# Patient Record
Sex: MALE | Race: WHITE | NOT HISPANIC OR LATINO | Employment: FULL TIME | URBAN - METROPOLITAN AREA
[De-identification: names, ages, dates, MRNs, and addresses within clinical notes are randomized per-mention and may not be internally consistent; named-entity substitution may affect disease eponyms.]

---

## 2020-09-18 ENCOUNTER — HOSPITAL ENCOUNTER (OUTPATIENT)
Dept: URGENT CARE | Facility: CLINIC | Age: 35
Discharge: HOME | End: 2020-09-18
Attending: FAMILY MEDICINE

## 2020-09-22 LAB — SARS-COV-2 RNA SPEC QL NAA+PROBE: NOT DETECTED

## 2021-05-19 ENCOUNTER — OFFICE VISIT CONVERTED (OUTPATIENT)
Dept: SURGERY | Facility: CLINIC | Age: 36
End: 2021-05-19
Attending: SURGERY

## 2021-05-19 ENCOUNTER — CONVERSION ENCOUNTER (OUTPATIENT)
Dept: SURGERY | Facility: CLINIC | Age: 36
End: 2021-05-19

## 2021-05-22 ENCOUNTER — TRANSCRIBE ORDERS (OUTPATIENT)
Dept: LAB | Facility: HOSPITAL | Age: 36
End: 2021-05-22

## 2021-05-22 DIAGNOSIS — Z01.818 PRE-OP TESTING: Primary | ICD-10-CM

## 2021-06-05 NOTE — H&P
"   History and Physical      Patient Name: Dominick Agarwal   Patient ID: [id number]   Sex: Male   YOB: 1985    Referring Provider: Henry Singh MD    Visit Date: May 19, 2021    Provider: Venkata Davenport MD   Location: [facility]   Location Address: [address] [place] [address]  [id number]   Location Phone: [phone]          Chief Complaint  · Hernia Consult      History Of Present Illness  Dominick Agarwal is a 35 year old /White male who presents to the office today as a consult from Henry Singh MD. He presents today for evaluation of an umbilical hernia. The patient reports that he first noticed this hernia about 5-6 months ago. He reports that he has some heavy lifting at his job and when he lifts or moves his abdomen against things, he does have some pain. He does report that over the last 5-6 months, the umbilical hernia does seem to be getting larger. He is not reporting any obstructive type symptoms. He does not note any overlying skin changes.       Past Medical History  *No Pertinent Past Medical History         Past Surgical History  *Denies any surgical procedures         Allergy List  NO KNOWN DRUG ALLERGIES         Family Medical History  -Father's Family History Unknown; -Mother's Family History Unknown         Social History  Alcohol; Tobacco (Former)         Review of Systems  · Gastrointestinal  o Denies  o : nausea, vomiting, diarrhea, constipation      Vitals  Date Time BP Position Site L\R Cuff Size HR RR TEMP (F) WT  HT  BMI kg/m2 BSA m2 O2 Sat FR L/min FiO2 HC       05/19/2021 10:12 AM         298lbs 4oz 5'  11\" 41.6 2.6             Physical Examination  · Constitutional  o Appearance  o : well developed, well-nourished, alert and in no acute distress  · Head and Face  o Head  o :   § Inspection  § : no deformities or lesions  · Eyes  o Conjunctivae  o : clear  o Sclerae  o : clear  · Neck  o Inspection/Palpation  o : normal " appearance, no masses or tenderness, trachea midline  · Respiratory  o Respiratory Effort  o : breathing unlabored  o Inspection of Chest  o : normal appearance, no retractions  · Cardiovascular  o Heart  o : regular rate and rhythm  · Gastrointestinal  o Abdominal Examination  o : abdomen is soft. He has an umbilical hernia that is reducible and tender upon reduction.  · Lymphatic  o Neck  o : no lymphadenopathy present  o Axilla  o : no lymphadenopathy present  o Groin  o : no lymphadenopathy present  · Skin and Subcutaneous Tissue  o General Inspection  o : no rashes present, no lesions present, no areas of discoloration  · Neurologic  o Cranial Nerves  o : grossly intact  o Sensation  o : grossly intact  o Gait and Station  o :   § Gait Screening  § : normal gait, able to stand without diffculty  o Cerebellar Function  o : no obvious abnormalities  · Psychiatric  o Judgement and Insight  o : judgment and insight intact  o Mood and Affect  o : mood normal, affect appropriate          Assessment  · Pre-Surgical Orders     V72.84  · Hernia, Umibilical     553.1  · Pre-op testing     V72.84/Z01.818       Patient with symptomatic umbilical hernia. It is not a terribly large hernia and it is reducible but the patient reports that with doing all the heavy lifting, it seems like he will test the repair. Therefore, I have recommended an intraperitoneal repair.       Plan  · Orders  o General Surgery Order (GENOR) - 553.1, V72.84 - 06/11/2021  o [facility] [place] [facility] Pre-Op Covid-19 Screening (76888) - V72.84/Z01.818 - 06/07/2021 6/07/21 at 1130AM  · Medications  o Medications have been Reconciled  o Transition of Care or Provider Policy  · Instructions  o PLAN:   o Handouts Provided-Pre-Procedure Instructions including date and time and location of procedure.  o Surgical Facility: [facility]  o ****Patient Status****  o Outpatient  o ********************  o RISK AND BENEFITS:  o Consent for surgery: Given these options, the  patient has verbally expressed an understanding of the risks of surgery and finds these risks acceptable. We will proceed with surgery as soon as possible.  o Consult Anesthesia for any post-operative block, or any pain management procedure deemed necessary by the anestesiologist for adequate post-operative pain control.   o O.R. PREP: Per protocol  o IV: Per Anesthesia  o IV: LR@ 75ml/hr  o PLEASE SIGN PERMIT FOR: Robotic umbilical hernia repair with possible mesh  o *__Kefzol 2 gram IV on call to OR.  o *___The above History and Physical Examination has been completed within 30 days of admission.  o Pre-Admission Testing Date: [facility] PHONE: 6/07/21 at 130PM  o Electronically Identified Patient Education Materials Provided Electronically     Pros and cons of open, laparoscopic, and robotic repair were discussed. I have recommended a robotic repair. Risks, benefits, and alternatives were discussed with the patient extensively. All questions were answered. The patient voiced understanding and agrees to proceed with the above plan.             Electronically Signed by: Cheli Carlson-, -Author on May 21, 2021 01:48:43 PM  Electronically Co-signed by: Venkata Davenport MD -Reviewer on May 22, 2021 05:02:48 PM

## 2021-06-05 NOTE — H&P (VIEW-ONLY)
"   History and Physical      Patient Name: Dominick Agarwal   Patient ID: [id number]   Sex: Male   YOB: 1985    Referring Provider: Henry Singh MD    Visit Date: May 19, 2021    Provider: Venkata Davenport MD   Location[place] [facility]   Location Address: [address] [place] [address]  [id number]   Location Phone: [phone]          Chief Complaint  · Hernia Consult      History Of Present Illness  Dominick Agarwal is a 35 year old /White male who presents to the office today as a consult from Henry Singh MD. He presents today for evaluation of an umbilical hernia. The patient reports that he first noticed this hernia about 5-6 months ago. He reports that he has some heavy lifting at his job and when he lifts or moves his abdomen against things, he does have some pain. He does report that over the last 5-6 months, the umbilical hernia does seem to be getting larger. He is not reporting any obstructive type symptoms. He does not note any overlying skin changes.       Past Medical History  *No Pertinent Past Medical History         Past Surgical History  *Denies any surgical procedures         Allergy List  NO KNOWN DRUG ALLERGIES         Family Medical History  -Father's Family History Unknown; -Mother's Family History Unknown         Social History  Alcohol; Tobacco (Former)         Review of Systems  · Gastrointestinal  o Denies  o : nausea, vomiting, diarrhea, constipation      Vitals  Date Time BP Position Site L\R Cuff Size HR RR TEMP (F) WT  HT  BMI kg/m2 BSA m2 O2 Sat FR L/min FiO2 HC       05/19/2021 10:12 AM         298lbs 4oz 5'  11\" 41.6 2.6             Physical Examination  · Constitutional  o Appearance  o : well developed, well-nourished, alert and in no acute distress  · Head and Face  o Head  o :   § Inspection  § : no deformities or lesions  · Eyes  o Conjunctivae  o : clear  o Sclerae  o : clear  · Neck  o Inspection/Palpation  o : normal " appearance, no masses or tenderness, trachea midline  · Respiratory  o Respiratory Effort  o : breathing unlabored  o Inspection of Chest  o : normal appearance, no retractions  · Cardiovascular  o Heart  o : regular rate and rhythm  · Gastrointestinal  o Abdominal Examination  o : abdomen is soft. He has an umbilical hernia that is reducible and tender upon reduction.  · Lymphatic  o Neck  o : no lymphadenopathy present  o Axilla  o : no lymphadenopathy present  o Groin  o : no lymphadenopathy present  · Skin and Subcutaneous Tissue  o General Inspection  o : no rashes present, no lesions present, no areas of discoloration  · Neurologic  o Cranial Nerves  o : grossly intact  o Sensation  o : grossly intact  o Gait and Station  o :   § Gait Screening  § : normal gait, able to stand without diffculty  o Cerebellar Function  o : no obvious abnormalities  · Psychiatric  o Judgement and Insight  o : judgment and insight intact  o Mood and Affect  o : mood normal, affect appropriate          Assessment  · Pre-Surgical Orders     V72.84  · Hernia, Umibilical     553.1  · Pre-op testing     V72.84/Z01.818       Patient with symptomatic umbilical hernia. It is not a terribly large hernia and it is reducible but the patient reports that with doing all the heavy lifting, it seems like he will test the repair. Therefore, I have recommended an intraperitoneal repair.       Plan  · Orders  o General Surgery Order (GENOR) - 553.1, V72.84 - 06/11/2021  o [facility] [place] Pre-Op Covid-19 Screening (07926) - V72.84/Z01.818 - 06/07/2021 6/07/21 at 1130AM  · Medications  o Medications have been Reconciled  o Transition of Care or Provider Policy  · Instructions  o PLAN:   o Handouts Provided-Pre-Procedure Instructions including date and time and location of procedure.  o Surgical Facility: [facility]  o ****Patient Status****  o Outpatient  o ********************  o RISK AND BENEFITS:  o Consent for surgery: Given these options, the  patient has verbally expressed an understanding of the risks of surgery and finds these risks acceptable. We will proceed with surgery as soon as possible.  o Consult Anesthesia for any post-operative block, or any pain management procedure deemed necessary by the anestesiologist for adequate post-operative pain control.   o O.R. PREP: Per protocol  o IV: Per Anesthesia  o IV: LR@ 75ml/hr  o PLEASE SIGN PERMIT FOR: Robotic umbilical hernia repair with possible mesh  o *__Kefzol 2 gram IV on call to OR.  o *___The above History and Physical Examination has been completed within 30 days of admission.  o Pre-Admission Testing Date: [facility] PHONE: 6/07/21 at 130PM  o Electronically Identified Patient Education Materials Provided Electronically     Pros and cons of open, laparoscopic, and robotic repair were discussed. I have recommended a robotic repair. Risks, benefits, and alternatives were discussed with the patient extensively. All questions were answered. The patient voiced understanding and agrees to proceed with the above plan.             Electronically Signed by: Cheli Carlson-, -Author on May 21, 2021 01:48:43 PM  Electronically Co-signed by: Venkata Davenport MD -Reviewer on May 22, 2021 05:02:48 PM

## 2021-06-07 ENCOUNTER — LAB (OUTPATIENT)
Dept: LAB | Facility: HOSPITAL | Age: 36
End: 2021-06-07

## 2021-06-07 DIAGNOSIS — Z01.818 PRE-OP TESTING: ICD-10-CM

## 2021-06-07 PROCEDURE — C9803 HOPD COVID-19 SPEC COLLECT: HCPCS

## 2021-06-07 PROCEDURE — U0003 INFECTIOUS AGENT DETECTION BY NUCLEIC ACID (DNA OR RNA); SEVERE ACUTE RESPIRATORY SYNDROME CORONAVIRUS 2 (SARS-COV-2) (CORONAVIRUS DISEASE [COVID-19]), AMPLIFIED PROBE TECHNIQUE, MAKING USE OF HIGH THROUGHPUT TECHNOLOGIES AS DESCRIBED BY CMS-2020-01-R: HCPCS

## 2021-06-07 NOTE — PRE-PROCEDURE INSTRUCTIONS
PATIENT INSTRUCTED TO BE:    - NPO AFTER MIDNIGHT EXCEPT CAN HAVE CLEAR LIQUIDS 2 HOURS PIROR TO SURGERY ARRIVAL TIME     - TO HOLD ALL VITAMINS, SUPPLEMENTS, NSAIDS FOR ONE WEEK PRIOR TO THEIR SURGICAL PROCEDURE    - INSTRUCTED TO TAKE THE FOLLOWING MEDICATIONS THE DAY OF SURGERY:    NO MEDICATIONS    PATIENT VERBALIZED UNDERSTANDING

## 2021-06-08 LAB — SARS-COV-2 RNA RESP QL NAA+PROBE: NOT DETECTED

## 2021-06-11 ENCOUNTER — HOSPITAL ENCOUNTER (OUTPATIENT)
Facility: HOSPITAL | Age: 36
Discharge: HOME | End: 2021-06-11
Attending: SURGERY | Admitting: SURGERY

## 2021-06-11 ENCOUNTER — ANESTHESIA (OUTPATIENT)
Dept: PERIOP | Facility: HOSPITAL | Age: 36
End: 2021-06-11

## 2021-06-11 ENCOUNTER — ANESTHESIA EVENT (OUTPATIENT)
Dept: PERIOP | Facility: HOSPITAL | Age: 36
End: 2021-06-11

## 2021-06-11 VITALS
SYSTOLIC BLOOD PRESSURE: 146 MMHG | HEIGHT: 71 IN | HEART RATE: 68 BPM | WEIGHT: 299.61 LBS | BODY MASS INDEX: 41.94 KG/M2 | RESPIRATION RATE: 18 BRPM | OXYGEN SATURATION: 95 % | DIASTOLIC BLOOD PRESSURE: 92 MMHG | TEMPERATURE: 97.5 F

## 2021-06-11 DIAGNOSIS — K42.9 UMBILICAL HERNIA WITHOUT OBSTRUCTION AND WITHOUT GANGRENE: Primary | ICD-10-CM

## 2021-06-11 PROCEDURE — 25010000002 MIDAZOLAM PER 1MG: Performed by: ANESTHESIOLOGY

## 2021-06-11 PROCEDURE — 25010000002 HYDROMORPHONE 1 MG/ML SOLUTION: Performed by: NURSE ANESTHETIST, CERTIFIED REGISTERED

## 2021-06-11 PROCEDURE — C1889 IMPLANT/INSERT DEVICE, NOC: HCPCS | Performed by: SURGERY

## 2021-06-11 PROCEDURE — 25010000002 PROPOFOL 10 MG/ML EMULSION: Performed by: NURSE ANESTHETIST, CERTIFIED REGISTERED

## 2021-06-11 PROCEDURE — 25010000003 LIDOCAINE 1 % SOLUTION 20 ML VIAL: Performed by: SURGERY

## 2021-06-11 PROCEDURE — 25010000002 DEXAMETHASONE PER 1 MG: Performed by: NURSE ANESTHETIST, CERTIFIED REGISTERED

## 2021-06-11 PROCEDURE — 25010000002 ONDANSETRON PER 1 MG: Performed by: NURSE ANESTHETIST, CERTIFIED REGISTERED

## 2021-06-11 PROCEDURE — 49652 PR LAP, VENTRAL HERNIA REPAIR,REDUCIBLE: CPT | Performed by: SURGERY

## 2021-06-11 PROCEDURE — 25010000003 MEPERIDINE PER 100 MG: Performed by: NURSE ANESTHETIST, CERTIFIED REGISTERED

## 2021-06-11 PROCEDURE — 25010000002 FENTANYL CITRATE (PF) 50 MCG/ML SOLUTION: Performed by: NURSE ANESTHETIST, CERTIFIED REGISTERED

## 2021-06-11 PROCEDURE — C1781 MESH (IMPLANTABLE): HCPCS | Performed by: SURGERY

## 2021-06-11 PROCEDURE — 49652 PR LAP, VENTRAL HERNIA REPAIR,REDUCIBLE: CPT | Performed by: SPECIALIST/TECHNOLOGIST, OTHER

## 2021-06-11 DEVICE — VENTRALIGHT ST MESH
Type: IMPLANTABLE DEVICE | Site: UMBILICAL | Status: FUNCTIONAL
Brand: VENTRALIGHT ST

## 2021-06-11 DEVICE — ABSORBABLE WOUND CLOSURE DEVICE
Type: IMPLANTABLE DEVICE | Site: UMBILICAL | Status: FUNCTIONAL
Brand: V-LOC 180

## 2021-06-11 RX ORDER — MAGNESIUM HYDROXIDE 1200 MG/15ML
LIQUID ORAL AS NEEDED
Status: DISCONTINUED | OUTPATIENT
Start: 2021-06-11 | End: 2021-06-11 | Stop reason: HOSPADM

## 2021-06-11 RX ORDER — KETAMINE HYDROCHLORIDE 50 MG/ML
INJECTION, SOLUTION, CONCENTRATE INTRAMUSCULAR; INTRAVENOUS AS NEEDED
Status: DISCONTINUED | OUTPATIENT
Start: 2021-06-11 | End: 2021-06-11 | Stop reason: SURG

## 2021-06-11 RX ORDER — ACETAMINOPHEN 500 MG
1000 TABLET ORAL ONCE
Status: COMPLETED | OUTPATIENT
Start: 2021-06-11 | End: 2021-06-11

## 2021-06-11 RX ORDER — SUCCINYLCHOLINE/SOD CL,ISO/PF 100 MG/5ML
SYRINGE (ML) INTRAVENOUS AS NEEDED
Status: DISCONTINUED | OUTPATIENT
Start: 2021-06-11 | End: 2021-06-11 | Stop reason: SURG

## 2021-06-11 RX ORDER — HYDROCODONE BITARTRATE AND ACETAMINOPHEN 5; 325 MG/1; MG/1
1-2 TABLET ORAL EVERY 4 HOURS PRN
Qty: 20 TABLET | Refills: 0 | Status: SHIPPED | OUTPATIENT
Start: 2021-06-11 | End: 2022-10-11

## 2021-06-11 RX ORDER — CEFAZOLIN SODIUM IN 0.9 % NACL 3 G/100 ML
3 INTRAVENOUS SOLUTION, PIGGYBACK (ML) INTRAVENOUS ONCE
Status: COMPLETED | OUTPATIENT
Start: 2021-06-11 | End: 2021-06-11

## 2021-06-11 RX ORDER — SODIUM CHLORIDE, SODIUM LACTATE, POTASSIUM CHLORIDE, CALCIUM CHLORIDE 600; 310; 30; 20 MG/100ML; MG/100ML; MG/100ML; MG/100ML
9 INJECTION, SOLUTION INTRAVENOUS CONTINUOUS PRN
Status: DISCONTINUED | OUTPATIENT
Start: 2021-06-11 | End: 2021-06-11 | Stop reason: HOSPADM

## 2021-06-11 RX ORDER — FENTANYL CITRATE 50 UG/ML
INJECTION, SOLUTION INTRAMUSCULAR; INTRAVENOUS AS NEEDED
Status: DISCONTINUED | OUTPATIENT
Start: 2021-06-11 | End: 2021-06-11 | Stop reason: SURG

## 2021-06-11 RX ORDER — HYDROCODONE BITARTRATE AND ACETAMINOPHEN 5; 325 MG/1; MG/1
1 TABLET ORAL ONCE AS NEEDED
Status: DISCONTINUED | OUTPATIENT
Start: 2021-06-11 | End: 2021-06-11 | Stop reason: HOSPADM

## 2021-06-11 RX ORDER — ONDANSETRON 2 MG/ML
INJECTION INTRAMUSCULAR; INTRAVENOUS AS NEEDED
Status: DISCONTINUED | OUTPATIENT
Start: 2021-06-11 | End: 2021-06-11 | Stop reason: SURG

## 2021-06-11 RX ORDER — GLYCOPYRROLATE 0.2 MG/ML
0.2 INJECTION INTRAMUSCULAR; INTRAVENOUS
Status: COMPLETED | OUTPATIENT
Start: 2021-06-11 | End: 2021-06-11

## 2021-06-11 RX ORDER — DEXAMETHASONE SODIUM PHOSPHATE 4 MG/ML
INJECTION, SOLUTION INTRA-ARTICULAR; INTRALESIONAL; INTRAMUSCULAR; INTRAVENOUS; SOFT TISSUE AS NEEDED
Status: DISCONTINUED | OUTPATIENT
Start: 2021-06-11 | End: 2021-06-11 | Stop reason: SURG

## 2021-06-11 RX ORDER — MIDAZOLAM HYDROCHLORIDE 1 MG/ML
2 INJECTION INTRAMUSCULAR; INTRAVENOUS ONCE
Status: COMPLETED | OUTPATIENT
Start: 2021-06-11 | End: 2021-06-11

## 2021-06-11 RX ORDER — ROCURONIUM BROMIDE 10 MG/ML
INJECTION, SOLUTION INTRAVENOUS AS NEEDED
Status: DISCONTINUED | OUTPATIENT
Start: 2021-06-11 | End: 2021-06-11 | Stop reason: SURG

## 2021-06-11 RX ORDER — SODIUM CHLORIDE 0.9 % (FLUSH) 0.9 %
10 SYRINGE (ML) INJECTION EVERY 12 HOURS SCHEDULED
Status: DISCONTINUED | OUTPATIENT
Start: 2021-06-11 | End: 2021-06-11 | Stop reason: HOSPADM

## 2021-06-11 RX ORDER — MEPERIDINE HYDROCHLORIDE 25 MG/ML
12.5 INJECTION INTRAMUSCULAR; INTRAVENOUS; SUBCUTANEOUS
Status: DISCONTINUED | OUTPATIENT
Start: 2021-06-11 | End: 2021-06-11 | Stop reason: HOSPADM

## 2021-06-11 RX ORDER — ONDANSETRON 2 MG/ML
4 INJECTION INTRAMUSCULAR; INTRAVENOUS ONCE AS NEEDED
Status: DISCONTINUED | OUTPATIENT
Start: 2021-06-11 | End: 2021-06-11 | Stop reason: HOSPADM

## 2021-06-11 RX ORDER — DEXMEDETOMIDINE HYDROCHLORIDE 100 UG/ML
INJECTION, SOLUTION INTRAVENOUS AS NEEDED
Status: DISCONTINUED | OUTPATIENT
Start: 2021-06-11 | End: 2021-06-11 | Stop reason: SURG

## 2021-06-11 RX ORDER — OXYCODONE HYDROCHLORIDE 5 MG/1
5 TABLET ORAL
Status: DISCONTINUED | OUTPATIENT
Start: 2021-06-11 | End: 2021-06-11 | Stop reason: HOSPADM

## 2021-06-11 RX ORDER — DOCUSATE SODIUM 100 MG/1
200 CAPSULE, LIQUID FILLED ORAL 2 TIMES DAILY PRN
Qty: 20 CAPSULE | Refills: 0 | Status: SHIPPED | OUTPATIENT
Start: 2021-06-11 | End: 2022-10-11

## 2021-06-11 RX ORDER — PROPOFOL 10 MG/ML
VIAL (ML) INTRAVENOUS AS NEEDED
Status: DISCONTINUED | OUTPATIENT
Start: 2021-06-11 | End: 2021-06-11 | Stop reason: SURG

## 2021-06-11 RX ORDER — LIDOCAINE HYDROCHLORIDE 20 MG/ML
INJECTION, SOLUTION INFILTRATION; PERINEURAL AS NEEDED
Status: DISCONTINUED | OUTPATIENT
Start: 2021-06-11 | End: 2021-06-11 | Stop reason: SURG

## 2021-06-11 RX ORDER — SODIUM CHLORIDE 0.9 % (FLUSH) 0.9 %
10 SYRINGE (ML) INJECTION AS NEEDED
Status: DISCONTINUED | OUTPATIENT
Start: 2021-06-11 | End: 2021-06-11 | Stop reason: HOSPADM

## 2021-06-11 RX ORDER — SODIUM CHLORIDE, SODIUM LACTATE, POTASSIUM CHLORIDE, CALCIUM CHLORIDE 600; 310; 30; 20 MG/100ML; MG/100ML; MG/100ML; MG/100ML
75 INJECTION, SOLUTION INTRAVENOUS CONTINUOUS
Status: DISCONTINUED | OUTPATIENT
Start: 2021-06-11 | End: 2021-06-12 | Stop reason: HOSPADM

## 2021-06-11 RX ADMIN — PROPOFOL 200 MG: 10 INJECTION, EMULSION INTRAVENOUS at 16:46

## 2021-06-11 RX ADMIN — FENTANYL CITRATE 50 MCG: 50 INJECTION INTRAMUSCULAR; INTRAVENOUS at 16:46

## 2021-06-11 RX ADMIN — MIDAZOLAM HYDROCHLORIDE 2 MG: 1 INJECTION, SOLUTION INTRAMUSCULAR; INTRAVENOUS at 16:07

## 2021-06-11 RX ADMIN — LIDOCAINE HYDROCHLORIDE 100 MG: 20 INJECTION, SOLUTION INFILTRATION; PERINEURAL at 16:46

## 2021-06-11 RX ADMIN — DEXMEDETOMIDINE HYDROCHLORIDE 20 MCG: 100 INJECTION, SOLUTION INTRAVENOUS at 17:00

## 2021-06-11 RX ADMIN — ACETAMINOPHEN 1000 MG: 500 TABLET ORAL at 16:06

## 2021-06-11 RX ADMIN — KETAMINE HYDROCHLORIDE 30 MG: 50 INJECTION, SOLUTION INTRAMUSCULAR; INTRAVENOUS at 16:46

## 2021-06-11 RX ADMIN — DEXMEDETOMIDINE HYDROCHLORIDE 20 MCG: 100 INJECTION, SOLUTION INTRAVENOUS at 17:15

## 2021-06-11 RX ADMIN — HYDROMORPHONE HYDROCHLORIDE 0.5 MG: 1 INJECTION, SOLUTION INTRAMUSCULAR; INTRAVENOUS; SUBCUTANEOUS at 19:19

## 2021-06-11 RX ADMIN — SODIUM CHLORIDE, POTASSIUM CHLORIDE, SODIUM LACTATE AND CALCIUM CHLORIDE 9 ML/HR: 600; 310; 30; 20 INJECTION, SOLUTION INTRAVENOUS at 16:06

## 2021-06-11 RX ADMIN — MEPERIDINE HYDROCHLORIDE 12.5 MG: 25 INJECTION INTRAMUSCULAR; INTRAVENOUS; SUBCUTANEOUS at 18:47

## 2021-06-11 RX ADMIN — ROCURONIUM BROMIDE 45 MG: 10 INJECTION INTRAVENOUS at 16:51

## 2021-06-11 RX ADMIN — SUGAMMADEX 400 MG: 100 INJECTION, SOLUTION INTRAVENOUS at 18:08

## 2021-06-11 RX ADMIN — CEFAZOLIN 3 G: 1 INJECTION, POWDER, FOR SOLUTION INTRAMUSCULAR; INTRAVENOUS; PARENTERAL at 16:37

## 2021-06-11 RX ADMIN — DEXMEDETOMIDINE HYDROCHLORIDE 10 MCG: 100 INJECTION, SOLUTION INTRAVENOUS at 17:28

## 2021-06-11 RX ADMIN — GLYCOPYRROLATE 0.2 MG: 0.2 INJECTION INTRAMUSCULAR; INTRAVENOUS at 16:07

## 2021-06-11 RX ADMIN — DEXAMETHASONE SODIUM PHOSPHATE 12 MG: 4 INJECTION INTRA-ARTICULAR; INTRALESIONAL; INTRAMUSCULAR; INTRAVENOUS; SOFT TISSUE at 16:46

## 2021-06-11 RX ADMIN — ONDANSETRON 4 MG: 2 INJECTION INTRAMUSCULAR; INTRAVENOUS at 17:54

## 2021-06-11 RX ADMIN — ROCURONIUM BROMIDE 5 MG: 10 INJECTION INTRAVENOUS at 16:46

## 2021-06-11 RX ADMIN — DEXMEDETOMIDINE HYDROCHLORIDE 10 MCG: 100 INJECTION, SOLUTION INTRAVENOUS at 16:46

## 2021-06-11 RX ADMIN — HYDROMORPHONE HYDROCHLORIDE 0.5 MG: 1 INJECTION, SOLUTION INTRAMUSCULAR; INTRAVENOUS; SUBCUTANEOUS at 18:44

## 2021-06-11 RX ADMIN — Medication 160 MG: at 16:46

## 2021-06-11 RX ADMIN — HYDROMORPHONE HYDROCHLORIDE 0.5 MG: 1 INJECTION, SOLUTION INTRAMUSCULAR; INTRAVENOUS; SUBCUTANEOUS at 18:58

## 2021-06-11 RX ADMIN — FENTANYL CITRATE 50 MCG: 50 INJECTION INTRAMUSCULAR; INTRAVENOUS at 16:58

## 2021-06-11 NOTE — BRIEF OP NOTE
UMBILICAL HERNIA REPAIR LAPAROSCOPIC WITH DAVINCI ROBOT  Progress Note    Dominick VELÁSQUEZ Stefano  6/11/2021    Pre-op Diagnosis:   Umbilical hernia       Post-Op Diagnosis Codes:  Same    Procedure/CPT® Codes:        Procedure(s):  Robotic assisted laparoscopic umbilical herniorrhaphy with mesh placement    Surgeon(s):  Venkata Davenport MD    Anesthesia: General    Staff:   Circulator: Gloria Guaman RN  Scrub Person: Betsey Rao  Assistant: Sofya Packer CSA  Other: Alicia Hardy RN; Mariela Randolph RN; Deysi Cline RN  Assistant: Sofya Packer CSA      Estimated Blood Loss: 3    Urine Voided: * No values recorded between 6/11/2021  4:35 PM and 6/11/2021  6:05 PM *    Specimens:                None          Drains: * No LDAs found *    Findings: 3 umbilical hernias    Complications: None    Assistant: Sofya Packer CSA  was responsible for performing the following activities: Retraction, Suction, Irrigation, Suturing, Closing, Placing Dressing and Held/Positioned Camera and their skilled assistance was necessary for the success of this case.    Venkata Davenport MD     Date: 6/11/2021  Time: 18:10 EDT

## 2021-06-11 NOTE — ANESTHESIA PREPROCEDURE EVALUATION
Anesthesia Evaluation     Patient summary reviewed and Nursing notes reviewed   no history of anesthetic complications:  NPO Solid Status: > 8 hours  NPO Liquid Status: > 2 hours           Airway   Mallampati: I  TM distance: >3 FB  Neck ROM: full  No difficulty expected  Dental    (+) poor dentition    Pulmonary - negative pulmonary ROS and normal exam    breath sounds clear to auscultation  Cardiovascular - normal exam  Exercise tolerance: good (4-7 METS)    Rhythm: regular    (+) hypertension,       Neuro/Psych- negative ROS  GI/Hepatic/Renal/Endo - negative ROS     Musculoskeletal (-) negative ROS    Abdominal    Substance History - negative use     OB/GYN negative ob/gyn ROS         Other - negative ROS                       Anesthesia Plan    ASA 2     general   (Patient understands anesthesia not responsible for dental damage.)  intravenous induction     Anesthetic plan, all risks, benefits, and alternatives have been provided, discussed and informed consent has been obtained with: patient.  Use of blood products discussed with patient .   Plan discussed with CRNA.

## 2021-06-11 NOTE — ANESTHESIA POSTPROCEDURE EVALUATION
Patient: Dominick Agarwal    Procedure Summary     Date: 06/11/21 Room / Location: [facility] OR 08 / [facility] MAIN OR    Anesthesia Start: 1637 Anesthesia Stop: 1821    Procedure: Robotic assisted laparoscopic umbilical herniorrhaphy with mesh placement (N/A Abdomen) Diagnosis:     Surgeons: Venkata Davenport MD Provider: Mervin Cantor MD    Anesthesia Type: general ASA Status: 2          Anesthesia Type: general    Vitals  Vitals Value Taken Time   /71 06/11/21 1904   Temp 36.2 °C (97.1 °F) 06/11/21 1824   Pulse 86 06/11/21 1904   Resp 33 06/11/21 1824   SpO2 92 % 06/11/21 1904   Vitals shown include unvalidated device data.        Post Anesthesia Care and Evaluation    Patient location during evaluation: bedside  Patient participation: complete - patient participated  Level of consciousness: awake  Pain score: 0  Pain management: adequate  Airway patency: patent  Anesthetic complications: No anesthetic complications  PONV Status: none  Cardiovascular status: acceptable and stable  Respiratory status: acceptable and room air  Hydration status: acceptable

## 2021-06-11 NOTE — OP NOTE
Preoperative diagnosis: Umbilical hernia    Postoperative diagnosis: Umbilical hernia    Procedure: Robotic umbilical hernia repair    Surgeon: Ethel    Anesthesia: General    Assistant: Sofya Packer    EBL: 3    Specimens: None    Complications: None    Indications: Patient is a 35-year-old male with an increasing enlarging and symptomatic umbilical hernia.  Presents today for elective repair    Procedures     Patient was identified in the preoperative holding area.  Risk-benefit alternatives were again discussed.  Patient voiced understanding agreed to proceed.    Patient was brought back the operating room and placed supine on the operating table.  Monitoring devices Maged stockings were placed.  After induction of anesthesia patient was successfully intubated.  After intubation patient was prepped and draped in standard surgical fashion.  After prepping and draping timeout was performed to verify with correct patient and correct procedure.    We began by injecting local anesthesia in the left upper quadrant incision was made to accommodate a 12 mm trocar.  Veress needle was inserted through the incision intra-abdominal placement was verified with a normal saline drop test.  After verification abdomen was insufflated to 15 mmHg pressure.  We then removed the Veress needle and using the Visiport technique the abdomen was entered.  Once the abdomen was entered we reinserted laparoscope looked underneath her Visiport and Veress needle entry sites and saw no obvious underlying injury.  We then went about placing our subsequent trochars.  After injection of local anesthesia and under direct visualization an 8 mm trochars placed in the left upper left mid and left lower abdomen.  After successful placement of our trochars with no underlying injury we then brought in and docked the robot.    We inspected the abdominal wall the patient actually had 3 umbilical hernias one just above the umbilicus and 2 Swiss cheese  type hernias associated with the umbilicus.  We dissected into the preperitoneal space and reduced a large amount of preperitoneal fatty tissue from all of the hernias.  Once everything was completely reduced and we had the hernia is completely exposed, we closed all of the defects with running 0 V-Loc sutures using multiple stitches.  Once the defects were closed primarily, we brought in a Dominguez Lobo device to the center of the defects.  We then brought in an 11 cm round ventral light ST mesh that had a chandelier stitch of Prolene placed in the center.  Chandelier stitch was grasped with a Dominguez Lobo device and the mesh was brought up to the anterior abdominal wall.  Once it was flush with the wall, we sewed the mesh and circumferentially 360 degrees with multiple 3 0 V-Loc sutures.  Once the mesh was completely secured we inspected and made sure it was sewn in 360 degrees with no gaps or folds.  We then cut the Prolene suture.  We inspected our operative field and made sure everything appeared to be hemostatic with no evident with no underlying injury.    We then closed the 12 mm trocar site with a Dominguez Lobo device and 0 Vicryl suture.  Under direct visualization.  The robotic arms were then removed and we undocked the robot.  The remaining trochars removed under direct visualization and the abdomen was desufflated.  Skin incisions were closed with subcuticular 4-0 Vicryl stitch and dressed with skin affix skin glue.    Patient was then aroused anesthesia and taken off the OR table taken to PACU in stable condition.  Sponge needle and instrument counts were correct at the end of the case x2Huong Packer was present for the entirety of the procedure and helped in all portions of the procedure.    Diagnoses and all orders for this visit:    1. Umbilical hernia without obstruction and without gangrene (Primary)  -     HYDROcodone-acetaminophen (NORCO) 5-325 MG per tablet; Take 1-2 tablets by mouth  Every 4 (Four) Hours As Needed (Pain).  Dispense: 20 tablet; Refill: 0    Other orders  -     Cancel: ECG 12 Lead; Standing  -     Cancel: ECG 12 Lead  -     lactated ringers infusion  -     ceFAZolin in Sodium Chloride (ANCEF) IVPB solution 3 g  -     Vital Signs - Per Anesthesia Protocol; Standing  -     Remove Scopolamine Patch 24 Hours After Application; Standing  -     Pulse Oximetry, Continuous; Standing  -     Insert Peripheral IV; Standing  -     Saline Lock & Maintain IV Access; Standing  -     sodium chloride 0.9 % flush 10 mL  -     sodium chloride 0.9 % flush 10 mL  -     lactated ringers infusion  -     acetaminophen (TYLENOL) tablet 1,000 mg  -     midazolam (VERSED) injection 2 mg  -     glycopyrrolate (ROBINUL) injection 0.2 mg  -     Remove Scopolamine Patch 24 Hours After Application  -     Pulse Oximetry, Continuous  -     Insert Peripheral IV  -     Saline Lock & Maintain IV Access  -     Oxygen Therapy- Nasal Cannula; Titrate for SPO2: 90% - 95%; Standing  -     Pulse Oximetry, Continuous; Standing  -     Cardiac Monitoring; Standing  -     Vital Signs Every 5 Minutes for 15 Minutes, Every 15 Minutes Thereafter.; Standing  -     Apply Warming Jet; Standing  -     Suction; Standing  -     Notify Anesthesia of Any Acute Changes in Patient Condition; Standing  -     Notify Anesthesia for Unrelieved Pain; Standing  -     oxyCODONE (ROXICODONE) immediate release tablet 5 mg  -     HYDROmorphone (DILAUDID) injection 0.25 mg  -     HYDROmorphone (DILAUDID) injection 0.5 mg  -     ondansetron (ZOFRAN) injection 4 mg  -     meperidine (DEMEROL) injection 12.5 mg  -     Once Discharge Criteria to Floor Met, Follow Surgeon Orders; Standing  -     Discharge Patient From PACU When Discharge Criteria Met; Standing  -     bupivacaine-EPINEPHrine PF 20 mL, lidocaine 1 % 20 mL mixture  -     sodium chloride (NS) irrigation solution  -     Discharge patient; Standing  -     Discharge to Care of  When  Patient Meets Criteria; Standing  -     Maintain IV Access Until Discharge; Standing  -     Give Patient Post-Op Discharge Instructions Per Surgeon; Standing  -     Activity As Tolerated; Standing  -     HYDROcodone-acetaminophen (NORCO) 5-325 MG per tablet 1 tablet  -     Discharge Follow-up with Specified Provider: TIARA; 2 Weeks  -     Discharge Prescription Status; Standing  -     docusate sodium (COLACE) 250 MG capsule; Take 1 capsule by mouth 2 (Two) Times a Day As Needed for Constipation.  Dispense: 10 capsule; Refill: 0  -     Notify Physician or Go To The ED For the Following Conditions  -     Other Restrictions (Specify)  -     Wound Care  -     Discharge to Care of  When Patient Meets Criteria  -     Discharge Prescription Status

## 2021-06-21 ENCOUNTER — TELEPHONE (OUTPATIENT)
Dept: SURGERY | Facility: CLINIC | Age: 36
End: 2021-06-21

## 2021-06-21 NOTE — TELEPHONE ENCOUNTER
Patient said he had 3 hernia repair on 06/11.  He is having pain on the opposite side.  It is not swollen or red, but he feels like it is related to surgery.  He cannot really describe it. Call Bonnie, his wife, back at [phone].

## 2021-06-24 ENCOUNTER — OFFICE VISIT (OUTPATIENT)
Dept: SURGERY | Facility: CLINIC | Age: 36
End: 2021-06-24

## 2021-06-24 VITALS — BODY MASS INDEX: 41.23 KG/M2 | RESPIRATION RATE: 16 BRPM | HEIGHT: 70 IN | WEIGHT: 288 LBS

## 2021-06-24 DIAGNOSIS — K42.9 UMBILICAL HERNIA WITHOUT OBSTRUCTION AND WITHOUT GANGRENE: Primary | ICD-10-CM

## 2021-06-24 PROCEDURE — 99024 POSTOP FOLLOW-UP VISIT: CPT | Performed by: SURGERY

## 2021-06-24 RX ORDER — SACCHAROMYCES BOULARDII 250 MG
250 CAPSULE ORAL 2 TIMES DAILY
Qty: 14 CAPSULE | Refills: 0 | Status: SHIPPED | OUTPATIENT
Start: 2021-06-24 | End: 2021-07-01

## 2021-06-24 RX ORDER — METRONIDAZOLE 500 MG/1
500 TABLET ORAL SEE ADMIN INSTRUCTIONS
Qty: 4 TABLET | Refills: 0 | Status: SHIPPED | OUTPATIENT
Start: 2021-06-24 | End: 2021-06-25

## 2021-06-24 NOTE — PROGRESS NOTES
Chief Complaint: Post-op    Subjective         History of Present Illness  Dominick Agarwal is a 35 y.o. male presents to [facility] to be seen for follow-up after robotic umbilical hernia repair.  Patient is doing well overall.  He does report some pain in his lower abdomen and diarrhea postoperatively.  No fevers or chills no nausea or vomiting.  Minimal incisional pain.    Objective     Past Medical History:   Diagnosis Date   • Hypertension     no medications   • Umbilical hernia currently    currently being seen for surgery for hernia this admission        Past Surgical History:   Procedure Laterality Date   • UMBILICAL HERNIA REPAIR N/A 6/11/2021    Procedure: Robotic assisted laparoscopic umbilical herniorrhaphy with mesh placement;  Surgeon: Venkata Davenport MD;  Location: [facility] OR;  Service: Robotics - [facility];  Laterality: N/A;         Current Outpatient Medications:   •  docusate sodium (COLACE) 100 MG capsule, Take 2 capsules by mouth 2 (Two) Times a Day As Needed for Constipation., Disp: 20 capsule, Rfl: 0  •  HYDROcodone-acetaminophen (NORCO) 5-325 MG per tablet, Take 1-2 tablets by mouth Every 4 (Four) Hours As Needed (Pain)., Disp: 20 tablet, Rfl: 0  •  metroNIDAZOLE (Flagyl) 500 MG tablet, Take 1 tablet by mouth See Admin Instructions for 1 day. Take two (2) tablets at 7 PM and two (2) tablets at 11 PM the night prior to surgery., Disp: 4 tablet, Rfl: 0  •  saccharomyces boulardii (Florastor) 250 MG capsule, Take 1 capsule by mouth 2 (Two) Times a Day for 7 days., Disp: 14 capsule, Rfl: 0    No Known Allergies     Family History   Problem Relation Age of Onset   • No Known Problems Mother    • No Known Problems Father        Social History     Socioeconomic History   • Marital status:      Spouse name: Not on file   • Number of children: Not on file   • Years of education: Not on file   • Highest education level: Not on file   Tobacco Use   • Smoking  status: Former Smoker     Packs/day: 2.00   • Smokeless tobacco: Never Used   • Tobacco comment: quit 6 months ago (vapes now)   Vaping Use   • Vaping Use: Every day   • Substances: Nicotine   • Devices: Pre-filled or refillable cartridge   • Passive vaping exposure Yes   Substance and Sexual Activity   • Alcohol use: No     Comment: rarely   • Drug use: No   • Sexual activity: Defer        Physical Exam   Post Surgical Incision  Surgical wound: Wounds healing appropriately.  No signs of infection incisions are well-healed with no erythema no drainage.  No acute distress nonlabored breathing abdomen is soft    Result Review :               Assessment and Plan    Diagnoses and all orders for this visit:    1. Umbilical hernia without obstruction and without gangrene (Primary)    Other orders  -     metroNIDAZOLE (Flagyl) 500 MG tablet; Take 1 tablet by mouth See Admin Instructions for 1 day. Take two (2) tablets at 7 PM and two (2) tablets at 11 PM the night prior to surgery.  Dispense: 4 tablet; Refill: 0  -     saccharomyces boulardii (Florastor) 250 MG capsule; Take 1 capsule by mouth 2 (Two) Times a Day for 7 days.  Dispense: 14 capsule; Refill: 0        Follow Up   No follow-ups on file.  Patient was given instructions and counseling regarding his condition or for health maintenance advice. Please see specific information pulled into the AVS if appropriate.     From a surgical standpoint seems to be doing quite well postoperatively.  I have recommended continued lifting restrictions for another 2 weeks.  In regards to the surgery he can follow-up with me as needed I am pleased with his progress.    His lower abdominal pain and diarrhea could potentially be secondary to colitis, possibly even in relation to his perioperative antibiotics.  I will treat him with some Flagyl and Florastor.  If his symptoms completely resolve he requires no further follow-up.  If he fails to resolve the symptoms he may require  further work-up.    Discussed with the patient - all questions were answered they voiced understanding and agreed to proceed with above plan

## 2021-07-15 VITALS — BODY MASS INDEX: 41.75 KG/M2 | WEIGHT: 298.25 LBS | HEIGHT: 71 IN

## 2022-09-29 ENCOUNTER — PREP FOR SURGERY (OUTPATIENT)
Dept: OTHER | Facility: HOSPITAL | Age: 37
End: 2022-09-29

## 2022-09-29 ENCOUNTER — OFFICE VISIT (OUTPATIENT)
Dept: SURGERY | Facility: CLINIC | Age: 37
End: 2022-09-29

## 2022-09-29 VITALS — BODY MASS INDEX: 42.75 KG/M2 | WEIGHT: 298.6 LBS | HEIGHT: 70 IN

## 2022-09-29 DIAGNOSIS — K42.9 UMBILICAL HERNIA WITHOUT OBSTRUCTION AND WITHOUT GANGRENE: Primary | ICD-10-CM

## 2022-09-29 PROCEDURE — 99213 OFFICE O/P EST LOW 20 MIN: CPT | Performed by: SURGERY

## 2022-09-29 RX ORDER — SODIUM CHLORIDE, SODIUM LACTATE, POTASSIUM CHLORIDE, CALCIUM CHLORIDE 600; 310; 30; 20 MG/100ML; MG/100ML; MG/100ML; MG/100ML
100 INJECTION, SOLUTION INTRAVENOUS CONTINUOUS
Status: CANCELLED | OUTPATIENT
Start: 2022-09-29

## 2022-09-29 RX ORDER — CLINDAMYCIN PHOSPHATE 900 MG/50ML
900 INJECTION INTRAVENOUS ONCE
Status: CANCELLED | OUTPATIENT
Start: 2022-09-29 | End: 2022-09-29

## 2022-09-29 RX ORDER — ONDANSETRON 2 MG/ML
4 INJECTION INTRAMUSCULAR; INTRAVENOUS EVERY 6 HOURS PRN
Status: CANCELLED | OUTPATIENT
Start: 2022-09-29

## 2022-09-29 NOTE — PROGRESS NOTES
"Chief Complaint: Hernia (Umbilical)    Subjective         History of Present Illness  Dominick Agarwal is a 36 y.o. male presents to [facility]. The patient is to be seen for recurrent umbilical hernia.    Umbilical hernia   The patient reports that his umbilical hernia has returned approximately 6 months ago. He reports when he does a sit-up, he is able to visualize a \"bulge up here.\" He admits to weightlifting and being employed as a . He has a history of previous hernioplasty. The patient admits to waiting 4 weeks prior to returning to work for light duty when he noticed his hernia gradually return. He denies any known pop or tear to his mesh. He reports that initially status post hernioplasty “it was really hard and part of the bubble\" has been present postoperatively.       Objective     Past Medical History:   Diagnosis Date   • Hypertension     no medications   • Umbilical hernia currently    currently being seen for surgery for hernia this admission        Past Surgical History:   Procedure Laterality Date   • UMBILICAL HERNIA REPAIR N/A 6/11/2021    Procedure: Robotic assisted laparoscopic umbilical herniorrhaphy with mesh placement;  Surgeon: Venkata Davenport MD;  [facility];  Service: Robotics - [facility];  Laterality: N/A;         Current Outpatient Medications:   •  docusate sodium (COLACE) 100 MG capsule, Take 2 capsules by mouth 2 (Two) Times a Day As Needed for Constipation., Disp: 20 capsule, Rfl: 0  •  HYDROcodone-acetaminophen (NORCO) 5-325 MG per tablet, Take 1-2 tablets by mouth Every 4 (Four) Hours As Needed (Pain)., Disp: 20 tablet, Rfl: 0    No Known Allergies     Family History   Problem Relation Age of Onset   • No Known Problems Mother    • No Known Problems Father        Social History     Socioeconomic History   • Marital status:    Tobacco Use   • Smoking status: Former Smoker     Packs/day: 2.00   • Smokeless " "tobacco: Never Used   • Tobacco comment: quit 6 months ago (vapes now)   Vaping Use   • Vaping Use: Every day   • Substances: Nicotine   • Devices: Pre-filled or refillable cartridge   • Passive vaping exposure: Yes   Substance and Sexual Activity   • Alcohol use: No     Comment: rarely   • Drug use: No   • Sexual activity: Defer        Physical Exam   No acute distress. Nonlabored breathing. Abdomen is soft.   Reducible hernia in his umbilicus.     Result Review :               Assessment and Plan    There are no diagnoses linked to this encounter.     1. Umbilical hernia  - He demonstrated evidence of a recurrence of his umbilical hernia. I am able to reduce \"something\"at the umbilicus; therefore, we will plan for a recurrent umbilical hernia repair in the near future. Risks, benefits, alternatives of the procedure were discussed extensively.    Discussed with the patient - all questions were answered they voiced understanding and agreed to proceed with above plan      Follow Up   No follow-ups on file.  Patient was given instructions and counseling regarding his condition or for health maintenance advice. Please see specific information pulled into the AVS if appropriate.       Transcribed from ambient dictation for Venkata Davenport MD by Gabriel Castillo.  09/29/22   12:16 EDT    Patient verbalized consent to the visit recording.  I have personally performed the services described in this document as transcribed by the above individual, and it is both accurate and complete.    "

## 2022-10-11 NOTE — PRE-PROCEDURE INSTRUCTIONS
Patient instructed to have no food past midnight, clears up to 2 hours prior to arrival time. Patient instructed to wear no lotions, jewelry or piercing's day of surgery.  Patient to shower with surgical soap am of surgery.

## 2022-10-13 ENCOUNTER — ANESTHESIA EVENT (OUTPATIENT)
Dept: PERIOP | Facility: HOSPITAL | Age: 37
End: 2022-10-13

## 2022-10-13 ENCOUNTER — HOSPITAL ENCOUNTER (OUTPATIENT)
Facility: HOSPITAL | Age: 37
Setting detail: HOSPITAL OUTPATIENT SURGERY
Discharge: HOME | End: 2022-10-13
Attending: SURGERY | Admitting: SURGERY

## 2022-10-13 ENCOUNTER — ANESTHESIA (OUTPATIENT)
Dept: PERIOP | Facility: HOSPITAL | Age: 37
End: 2022-10-13

## 2022-10-13 VITALS
SYSTOLIC BLOOD PRESSURE: 152 MMHG | DIASTOLIC BLOOD PRESSURE: 94 MMHG | RESPIRATION RATE: 16 BRPM | OXYGEN SATURATION: 93 % | WEIGHT: 295.2 LBS | HEART RATE: 69 BPM | HEIGHT: 70 IN | BODY MASS INDEX: 42.26 KG/M2 | TEMPERATURE: 97.6 F

## 2022-10-13 DIAGNOSIS — K42.9 UMBILICAL HERNIA WITHOUT OBSTRUCTION AND WITHOUT GANGRENE: ICD-10-CM

## 2022-10-13 PROCEDURE — 25010000002 ONDANSETRON PER 1 MG: Performed by: NURSE ANESTHETIST, CERTIFIED REGISTERED

## 2022-10-13 PROCEDURE — 0 LIDOCAINE 1 % SOLUTION 10 ML VIAL: Performed by: SURGERY

## 2022-10-13 PROCEDURE — 25010000002 PROPOFOL 10 MG/ML EMULSION: Performed by: NURSE ANESTHETIST, CERTIFIED REGISTERED

## 2022-10-13 PROCEDURE — C1781 MESH (IMPLANTABLE): HCPCS | Performed by: SURGERY

## 2022-10-13 PROCEDURE — 25010000002 DEXAMETHASONE PER 1 MG: Performed by: NURSE ANESTHETIST, CERTIFIED REGISTERED

## 2022-10-13 PROCEDURE — 25010000002 HYDROMORPHONE 1 MG/ML SOLUTION: Performed by: NURSE ANESTHETIST, CERTIFIED REGISTERED

## 2022-10-13 PROCEDURE — 25010000002 FENTANYL CITRATE (PF) 50 MCG/ML SOLUTION: Performed by: NURSE ANESTHETIST, CERTIFIED REGISTERED

## 2022-10-13 PROCEDURE — 49652 PR LAP, VENTRAL HERNIA REPAIR,REDUCIBLE: CPT | Performed by: SURGERY

## 2022-10-13 PROCEDURE — 25010000002 KETOROLAC TROMETHAMINE PER 15 MG: Performed by: NURSE ANESTHETIST, CERTIFIED REGISTERED

## 2022-10-13 PROCEDURE — 49652 PR LAP, VENTRAL HERNIA REPAIR,REDUCIBLE: CPT | Performed by: SPECIALIST/TECHNOLOGIST, OTHER

## 2022-10-13 PROCEDURE — 0 MEPERIDINE PER 100 MG: Performed by: NURSE ANESTHETIST, CERTIFIED REGISTERED

## 2022-10-13 PROCEDURE — 25010000002 MIDAZOLAM PER 1 MG: Performed by: ANESTHESIOLOGY

## 2022-10-13 DEVICE — ABSORBABLE WOUND CLOSURE DEVICE
Type: IMPLANTABLE DEVICE | Site: ABDOMEN | Status: FUNCTIONAL
Brand: V-LOC 180

## 2022-10-13 DEVICE — VENTRALIGHT ST MESH
Type: IMPLANTABLE DEVICE | Site: ABDOMEN | Status: FUNCTIONAL
Brand: VENTRALIGHT ST

## 2022-10-13 RX ORDER — DOCUSATE SODIUM 100 MG/1
100 CAPSULE, LIQUID FILLED ORAL 2 TIMES DAILY PRN
Qty: 10 CAPSULE | Refills: 1 | Status: SHIPPED | OUTPATIENT
Start: 2022-10-13 | End: 2023-10-13

## 2022-10-13 RX ORDER — MIDAZOLAM HYDROCHLORIDE 1 MG/ML
2 INJECTION INTRAMUSCULAR; INTRAVENOUS ONCE
Status: COMPLETED | OUTPATIENT
Start: 2022-10-13 | End: 2022-10-13

## 2022-10-13 RX ORDER — SODIUM CHLORIDE, SODIUM LACTATE, POTASSIUM CHLORIDE, CALCIUM CHLORIDE 600; 310; 30; 20 MG/100ML; MG/100ML; MG/100ML; MG/100ML
9 INJECTION, SOLUTION INTRAVENOUS CONTINUOUS PRN
Status: DISCONTINUED | OUTPATIENT
Start: 2022-10-13 | End: 2022-10-13 | Stop reason: HOSPADM

## 2022-10-13 RX ORDER — CLINDAMYCIN PHOSPHATE 900 MG/50ML
900 INJECTION INTRAVENOUS ONCE
Status: COMPLETED | OUTPATIENT
Start: 2022-10-13 | End: 2022-10-13

## 2022-10-13 RX ORDER — MAGNESIUM HYDROXIDE 1200 MG/15ML
LIQUID ORAL AS NEEDED
Status: DISCONTINUED | OUTPATIENT
Start: 2022-10-13 | End: 2022-10-13 | Stop reason: HOSPADM

## 2022-10-13 RX ORDER — ONDANSETRON 2 MG/ML
INJECTION INTRAMUSCULAR; INTRAVENOUS AS NEEDED
Status: DISCONTINUED | OUTPATIENT
Start: 2022-10-13 | End: 2022-10-13 | Stop reason: SURG

## 2022-10-13 RX ORDER — HYDROCODONE BITARTRATE AND ACETAMINOPHEN 5; 325 MG/1; MG/1
1-2 TABLET ORAL EVERY 4 HOURS PRN
Qty: 30 TABLET | Refills: 0 | Status: SHIPPED | OUTPATIENT
Start: 2022-10-13

## 2022-10-13 RX ORDER — SODIUM CHLORIDE, SODIUM LACTATE, POTASSIUM CHLORIDE, CALCIUM CHLORIDE 600; 310; 30; 20 MG/100ML; MG/100ML; MG/100ML; MG/100ML
100 INJECTION, SOLUTION INTRAVENOUS CONTINUOUS
Status: DISCONTINUED | OUTPATIENT
Start: 2022-10-13 | End: 2022-10-13 | Stop reason: HOSPADM

## 2022-10-13 RX ORDER — HYDROCODONE BITARTRATE AND ACETAMINOPHEN 5; 325 MG/1; MG/1
1 TABLET ORAL ONCE AS NEEDED
Status: DISCONTINUED | OUTPATIENT
Start: 2022-10-13 | End: 2022-10-13 | Stop reason: HOSPADM

## 2022-10-13 RX ORDER — MEPERIDINE HYDROCHLORIDE 25 MG/ML
12.5 INJECTION INTRAMUSCULAR; INTRAVENOUS; SUBCUTANEOUS
Status: DISCONTINUED | OUTPATIENT
Start: 2022-10-13 | End: 2022-10-13 | Stop reason: HOSPADM

## 2022-10-13 RX ORDER — ACETAMINOPHEN 500 MG
1000 TABLET ORAL ONCE
Status: COMPLETED | OUTPATIENT
Start: 2022-10-13 | End: 2022-10-13

## 2022-10-13 RX ORDER — KETOROLAC TROMETHAMINE 30 MG/ML
INJECTION, SOLUTION INTRAMUSCULAR; INTRAVENOUS AS NEEDED
Status: DISCONTINUED | OUTPATIENT
Start: 2022-10-13 | End: 2022-10-13 | Stop reason: SURG

## 2022-10-13 RX ORDER — PROMETHAZINE HYDROCHLORIDE 25 MG/1
25 SUPPOSITORY RECTAL ONCE AS NEEDED
Status: DISCONTINUED | OUTPATIENT
Start: 2022-10-13 | End: 2022-10-13 | Stop reason: HOSPADM

## 2022-10-13 RX ORDER — ONDANSETRON 2 MG/ML
4 INJECTION INTRAMUSCULAR; INTRAVENOUS ONCE AS NEEDED
Status: DISCONTINUED | OUTPATIENT
Start: 2022-10-13 | End: 2022-10-13 | Stop reason: HOSPADM

## 2022-10-13 RX ORDER — ROCURONIUM BROMIDE 10 MG/ML
INJECTION, SOLUTION INTRAVENOUS AS NEEDED
Status: DISCONTINUED | OUTPATIENT
Start: 2022-10-13 | End: 2022-10-13 | Stop reason: SURG

## 2022-10-13 RX ORDER — DEXAMETHASONE SODIUM PHOSPHATE 4 MG/ML
INJECTION, SOLUTION INTRA-ARTICULAR; INTRALESIONAL; INTRAMUSCULAR; INTRAVENOUS; SOFT TISSUE AS NEEDED
Status: DISCONTINUED | OUTPATIENT
Start: 2022-10-13 | End: 2022-10-13 | Stop reason: SURG

## 2022-10-13 RX ORDER — LIDOCAINE HYDROCHLORIDE 20 MG/ML
INJECTION, SOLUTION EPIDURAL; INFILTRATION; INTRACAUDAL; PERINEURAL AS NEEDED
Status: DISCONTINUED | OUTPATIENT
Start: 2022-10-13 | End: 2022-10-13 | Stop reason: SURG

## 2022-10-13 RX ORDER — PROMETHAZINE HYDROCHLORIDE 12.5 MG/1
25 TABLET ORAL ONCE AS NEEDED
Status: DISCONTINUED | OUTPATIENT
Start: 2022-10-13 | End: 2022-10-13 | Stop reason: HOSPADM

## 2022-10-13 RX ORDER — PROPOFOL 10 MG/ML
VIAL (ML) INTRAVENOUS AS NEEDED
Status: DISCONTINUED | OUTPATIENT
Start: 2022-10-13 | End: 2022-10-13 | Stop reason: SURG

## 2022-10-13 RX ORDER — ONDANSETRON 2 MG/ML
4 INJECTION INTRAMUSCULAR; INTRAVENOUS EVERY 6 HOURS PRN
Status: DISCONTINUED | OUTPATIENT
Start: 2022-10-13 | End: 2022-10-13 | Stop reason: HOSPADM

## 2022-10-13 RX ORDER — FENTANYL CITRATE 50 UG/ML
INJECTION, SOLUTION INTRAMUSCULAR; INTRAVENOUS AS NEEDED
Status: DISCONTINUED | OUTPATIENT
Start: 2022-10-13 | End: 2022-10-13 | Stop reason: SURG

## 2022-10-13 RX ORDER — OXYCODONE HYDROCHLORIDE 5 MG/1
5 TABLET ORAL
Status: COMPLETED | OUTPATIENT
Start: 2022-10-13 | End: 2022-10-13

## 2022-10-13 RX ADMIN — LIDOCAINE HYDROCHLORIDE 100 MG: 20 INJECTION, SOLUTION EPIDURAL; INFILTRATION; INTRACAUDAL; PERINEURAL at 09:59

## 2022-10-13 RX ADMIN — SUGAMMADEX 200 MG: 100 INJECTION, SOLUTION INTRAVENOUS at 11:28

## 2022-10-13 RX ADMIN — KETOROLAC TROMETHAMINE 30 MG: 30 INJECTION, SOLUTION INTRAMUSCULAR; INTRAVENOUS at 11:28

## 2022-10-13 RX ADMIN — SODIUM CHLORIDE, POTASSIUM CHLORIDE, SODIUM LACTATE AND CALCIUM CHLORIDE 9 ML/HR: 600; 310; 30; 20 INJECTION, SOLUTION INTRAVENOUS at 08:44

## 2022-10-13 RX ADMIN — ACETAMINOPHEN 1000 MG: 500 TABLET, FILM COATED ORAL at 08:43

## 2022-10-13 RX ADMIN — CLINDAMYCIN IN 5 PERCENT DEXTROSE 900 MG: 18 INJECTION, SOLUTION INTRAVENOUS at 09:59

## 2022-10-13 RX ADMIN — MEPERIDINE HYDROCHLORIDE 12.5 MG: 25 INJECTION INTRAMUSCULAR; INTRAVENOUS; SUBCUTANEOUS at 11:48

## 2022-10-13 RX ADMIN — PROPOFOL 200 MG: 10 INJECTION, EMULSION INTRAVENOUS at 09:59

## 2022-10-13 RX ADMIN — FENTANYL CITRATE 100 MCG: 50 INJECTION, SOLUTION INTRAMUSCULAR; INTRAVENOUS at 09:59

## 2022-10-13 RX ADMIN — ROCURONIUM BROMIDE 20 MG: 10 INJECTION INTRAVENOUS at 10:41

## 2022-10-13 RX ADMIN — ONDANSETRON 4 MG: 2 INJECTION INTRAMUSCULAR; INTRAVENOUS at 11:27

## 2022-10-13 RX ADMIN — DEXAMETHASONE SODIUM PHOSPHATE 4 MG: 4 INJECTION, SOLUTION INTRA-ARTICULAR; INTRALESIONAL; INTRAMUSCULAR; INTRAVENOUS; SOFT TISSUE at 10:03

## 2022-10-13 RX ADMIN — ROCURONIUM BROMIDE 50 MG: 10 INJECTION INTRAVENOUS at 09:59

## 2022-10-13 RX ADMIN — OXYCODONE HYDROCHLORIDE 5 MG: 5 TABLET ORAL at 12:29

## 2022-10-13 RX ADMIN — HYDROMORPHONE HYDROCHLORIDE 0.5 MG: 1 INJECTION, SOLUTION INTRAMUSCULAR; INTRAVENOUS; SUBCUTANEOUS at 11:47

## 2022-10-13 RX ADMIN — MIDAZOLAM HYDROCHLORIDE 2 MG: 1 INJECTION, SOLUTION INTRAMUSCULAR; INTRAVENOUS at 09:02

## 2022-10-13 RX ADMIN — OXYCODONE HYDROCHLORIDE 5 MG: 5 TABLET ORAL at 12:12

## 2022-10-13 NOTE — ANESTHESIA PREPROCEDURE EVALUATION
Anesthesia Evaluation     Patient summary reviewed and Nursing notes reviewed                Airway   Mallampati: I  TM distance: >3 FB  Neck ROM: full  No difficulty expected  Dental      Pulmonary - negative pulmonary ROS and normal exam    breath sounds clear to auscultation  Cardiovascular - normal exam    Rhythm: regular  Rate: normal    (+) hypertension,       Neuro/Psych- negative ROS  GI/Hepatic/Renal/Endo    (+) morbid obesity,      Musculoskeletal (-) negative ROS    Abdominal    Substance History - negative use     OB/GYN negative ob/gyn ROS         Other                      Anesthesia Plan    ASA 2     general     intravenous induction     Anesthetic plan, risks, benefits, and alternatives have been provided, discussed and informed consent has been obtained with: patient.        CODE STATUS:

## 2022-10-13 NOTE — OP NOTE
Preoperative diagnosis: Recurrent umbilical hernia    Postoperative diagnosis: No evidence of recurrent umbilical hernia, patient had abdominal wall diastases    Procedure: Robotic imbrication of abdominal wall diastasis and replacement of mesh    Surgeon: Ethel    Anesthesia: General    Assistant: Sofya Packer    EBL: 7    Specimens: None    Complications: None    Indications: 36-year-old male who seems to have a recurrent umbilical hernia on exam.  He was taken to the operating room today for current umbilical hernia repair  PROCEDURE    Patient was seen in the preoperative holding area.  Risks, benefits, alternatives of the operation were again discussed in detail.  All of the patient and family's questions were answered.  They voiced understanding, and agreed to proceed.    Patient was brought to the operating room.  Monitoring devices and Maged stockings were placed.  Anesthesia was administered.  Patient was prepped and draped in standard surgical fashion.  After prepping and draping a timeout was performed to verify both the correct patient and correct procedure.    We began by injecting local anesthesia in the left upper quadrant.  An incision to accommodate a 12 mm trocar was made in the left upper quadrant, and the Veress needle was inserted into the abdomen.  Intra-abdominal placement was verified with a normal saline drop test.  After verification, the abdomen was insufflated to 15 mmHg pressure.  Once the abdomen was insufflated, we removed the Veress needle.  Then using the Visiport technique the abdomen was entered in the left upper quadrant.  Once the abdomen was entered, we reinserted the laparoscope and looked underneath our Veress needle and Visiport entry sites, making sure there was no underlying injury.  We then went about placing our subsequent trochars.  After injection of local anesthesia and under direct visualization, an 8 mm trocar was placed in the left upper, left mid, and left  lower abdomen.    We then brought in and docked the robot.  Once the robot was in appropriate position, we began by   Eating down adhesions to the previously placed mesh.  Once we had all the adhesions down we noted that there did not appear to be a recurrent umbilical hernia.  The mesh was in appropriate position and was well incorporated into the abdominal wall.  What was noted that he did have a fairly significant diastases near the umbilicus.  The mesh was domed and there was some diastases of the rectus muscles.  In order to verify that there was not an open hernia behind the mesh and went ahead and took out the previously placed piece of mesh.  It was dissected free with electrocautery.  It was removed from the abdomen in its entirety.  Once we had the mesh completely removed we noted no obvious umbilical hernia.  Given the fact that the patient was uncomfortable and did have some protrusion.  I went ahead and tried to imbricate this abdominal wall diastases.  We used many 0 V-Loc sutures to try and reincorporate and imbricate the abdominal wall.  Once we had reapproximation of the tissues, I then placed a 13 cm round piece of ventral light ST mesh which was larger than his previously placed mesh.  This was brought in the abdomen and grasped by a chandelier stitch with a Dominguez-Ana device.  It was sewn into place with multiple 3-0 V-Loc sutures.  Once mesh was sewn in circumferentially, we inspected the abdomen.  We made sure there were no obvious underlying injuries and that the entire abdomen was hemostatic.    We then removed the 12 mm trocar, and that site was closed with a Dominguez Lobo device and 0 Vicryl suture under direct visualization.  The remaining trochars were then removed under direct visualization, and the abdomen was desufflated.  Skin incisions were closed with subcuticular 4-0 Vicryl stitch and dressed with skin glue.    The patient was then aroused anesthesia, and taken off the OR  table, and taken to PACU in stable condition.  Sponge, needle, and instrument counts were correct x2.  Sofya Packer was present for the entire the procedure and helped in all portions of the case.    Assistant: Sofya Packer CSA was responsible for performing the following activities: Retraction, Suction, Irrigation, Suturing, Closing, Placing Dressing and Held/Positioned Camera and their skilled assistance was necessary for the success of this case.      The operative note was dictated with the help of the dragon dictation system.

## 2022-10-13 NOTE — ANESTHESIA POSTPROCEDURE EVALUATION
Patient: Dominick Agarwal    Procedure Summary     Date: 10/13/22 Room / Location: [facility] OR 08 / [facility] MAIN OR    Anesthesia Start: 0949 Anesthesia Stop: 1148    Procedure: UMBILICAL HERNIA REPAIR LAPAROSCOPIC WITH DAVINCI ROBOT (Abdomen) Diagnosis:       Umbilical hernia without obstruction and without gangrene      (Umbilical hernia without obstruction and without gangrene [K42.9])    Surgeons: Venkata Davenport MD Provider: Mervin Cantor MD    Anesthesia Type: general ASA Status: 2          Anesthesia Type: general    Vitals  Vitals Value Taken Time   /87 10/13/22 1206   Temp 36.5 °C (97.7 °F) 10/13/22 1145   Pulse 66 10/13/22 1210   Resp 16 10/13/22 1205   SpO2 93 % 10/13/22 1210   Vitals shown include unvalidated device data.        Post Anesthesia Care and Evaluation    Patient location during evaluation: bedside  Patient participation: complete - patient participated  Level of consciousness: awake  Pain management: adequate    Airway patency: patent  Anesthetic complications: No anesthetic complications  PONV Status: none  Cardiovascular status: acceptable  Respiratory status: acceptable  Hydration status: acceptable    Comments: An Anesthesiologist personally participated in the most demanding procedures (including induction and emergence if applicable) in the anesthesia plan, monitored the course of anesthesia administration at frequent intervals and remained physically present and available for immediate diagnosis and treatment of emergencies.

## 2022-10-13 NOTE — DISCHARGE INSTRUCTIONS
DISCHARGE INSTRUCTIONS  HERNIA      For your surgery you had:  General anesthesia (you may have a sore throat for the first 24 hours)      You may experience dizziness, drowsiness, or light-headedness for several hours following surgery/procedure.  Do not stay alone today or tonight.  Limit your activity for 24 hours.  Resume your diet slowly.  Follow whatever special dietary instructions you may have been given by your doctor.  You should not drive, operate machinery, drink alcohol, or sign legally binding documents for 24 hours or while you are taking pain medication.    Last dose of pain medication was given at:  1147 0.5mg of Dilaudid   1212 5mg of Oxycodone  1229 5mg of Oxycodone      NOTIFY YOUR DOCTOR IF YOU EXPERIENCE ANY OF THE FOLLOWING:  Temperature greater than 101 degrees Fahrenheit  Shaking Chills  Redness or excessive drainage from incision  Nausea, vomiting and/or pain that is not controlled by prescribed medications  Increase in bleeding or bleeding that is excessive  Unable to urinate in 6 hours after surgery  If unable to reach your doctor, please go to the closest Emergency Room   [x] You may shower tomorrow    Apply an ice pack for 24-48 hours.  Do not do any heavy lifting, pushing or pulling.  You may walk up and down stairs.  You may ride in a car but do not drive until instructed by your physician.  Avoid constipation.  If unable to urinate in 6 to 8 hours after surgery or urinating frequently in small amounts, notify your doctor or go to the nearest Emergency Room.  Medications per physician instructions as indicated on Discharge Medication Information Sheet.    SPECIAL INSTRUCTIONS:  May shower tomorrow.  No tub bathing, swimming, or submerging incisions for 2 weeks. No lifting greater than 15 pounds or strenuous activity for 4 weeks.  No driving if taking narcotic pain medication.

## 2022-10-15 ENCOUNTER — APPOINTMENT (OUTPATIENT)
Dept: GENERAL RADIOLOGY | Facility: HOSPITAL | Age: 37
End: 2022-10-15

## 2022-10-15 ENCOUNTER — HOSPITAL ENCOUNTER (EMERGENCY)
Facility: HOSPITAL | Age: 37
Discharge: HOME | End: 2022-10-15
Attending: EMERGENCY MEDICINE | Admitting: EMERGENCY MEDICINE

## 2022-10-15 VITALS
BODY MASS INDEX: 42.7 KG/M2 | TEMPERATURE: 98 F | WEIGHT: 298.28 LBS | HEIGHT: 70 IN | DIASTOLIC BLOOD PRESSURE: 58 MMHG | RESPIRATION RATE: 18 BRPM | OXYGEN SATURATION: 99 % | SYSTOLIC BLOOD PRESSURE: 109 MMHG | HEART RATE: 87 BPM

## 2022-10-15 DIAGNOSIS — K59.03 DRUG-INDUCED CONSTIPATION: Primary | ICD-10-CM

## 2022-10-15 LAB
ALBUMIN SERPL-MCNC: 4.2 G/DL (ref 3.5–5.2)
ALBUMIN/GLOB SERPL: 1.4 G/DL
ALP SERPL-CCNC: 64 U/L (ref 39–117)
ALT SERPL W P-5'-P-CCNC: 32 U/L (ref 1–41)
ANION GAP SERPL CALCULATED.3IONS-SCNC: 9.4 MMOL/L (ref 5–15)
AST SERPL-CCNC: 30 U/L (ref 1–40)
BASOPHILS # BLD AUTO: 0.04 10*3/MM3 (ref 0–0.2)
BASOPHILS NFR BLD AUTO: 0.4 % (ref 0–1.5)
BILIRUB SERPL-MCNC: 0.2 MG/DL (ref 0–1.2)
BILIRUB UR QL STRIP: NEGATIVE
BUN SERPL-MCNC: 16 MG/DL (ref 6–20)
BUN/CREAT SERPL: 15.7 (ref 7–25)
CALCIUM SPEC-SCNC: 9.3 MG/DL (ref 8.6–10.5)
CHLORIDE SERPL-SCNC: 102 MMOL/L (ref 98–107)
CLARITY UR: ABNORMAL
CO2 SERPL-SCNC: 29.6 MMOL/L (ref 22–29)
COLOR UR: YELLOW
CREAT SERPL-MCNC: 1.02 MG/DL (ref 0.76–1.27)
DEPRECATED RDW RBC AUTO: 39.2 FL (ref 37–54)
EGFRCR SERPLBLD CKD-EPI 2021: 96.5 ML/MIN/1.73
EOSINOPHIL # BLD AUTO: 0.4 10*3/MM3 (ref 0–0.4)
EOSINOPHIL NFR BLD AUTO: 4.4 % (ref 0.3–6.2)
ERYTHROCYTE [DISTWIDTH] IN BLOOD BY AUTOMATED COUNT: 12.5 % (ref 12.3–15.4)
GLOBULIN UR ELPH-MCNC: 3 GM/DL
GLUCOSE SERPL-MCNC: 93 MG/DL (ref 65–99)
GLUCOSE UR STRIP-MCNC: NEGATIVE MG/DL
HCT VFR BLD AUTO: 41.1 % (ref 37.5–51)
HGB BLD-MCNC: 14.2 G/DL (ref 13–17.7)
HGB UR QL STRIP.AUTO: NEGATIVE
HOLD SPECIMEN: NORMAL
HOLD SPECIMEN: NORMAL
IMM GRANULOCYTES # BLD AUTO: 0.01 10*3/MM3 (ref 0–0.05)
IMM GRANULOCYTES NFR BLD AUTO: 0.1 % (ref 0–0.5)
KETONES UR QL STRIP: NEGATIVE
LEUKOCYTE ESTERASE UR QL STRIP.AUTO: NEGATIVE
LIPASE SERPL-CCNC: 20 U/L (ref 13–60)
LYMPHOCYTES # BLD AUTO: 3.18 10*3/MM3 (ref 0.7–3.1)
LYMPHOCYTES NFR BLD AUTO: 35.1 % (ref 19.6–45.3)
MCH RBC QN AUTO: 29.8 PG (ref 26.6–33)
MCHC RBC AUTO-ENTMCNC: 34.5 G/DL (ref 31.5–35.7)
MCV RBC AUTO: 86.2 FL (ref 79–97)
MONOCYTES # BLD AUTO: 0.61 10*3/MM3 (ref 0.1–0.9)
MONOCYTES NFR BLD AUTO: 6.7 % (ref 5–12)
NEUTROPHILS NFR BLD AUTO: 4.83 10*3/MM3 (ref 1.7–7)
NEUTROPHILS NFR BLD AUTO: 53.3 % (ref 42.7–76)
NITRITE UR QL STRIP: NEGATIVE
NRBC BLD AUTO-RTO: 0 /100 WBC (ref 0–0.2)
PH UR STRIP.AUTO: 8 [PH] (ref 5–8)
PLATELET # BLD AUTO: 263 10*3/MM3 (ref 140–450)
PMV BLD AUTO: 9.8 FL (ref 6–12)
POTASSIUM SERPL-SCNC: 4 MMOL/L (ref 3.5–5.2)
PROT SERPL-MCNC: 7.2 G/DL (ref 6–8.5)
PROT UR QL STRIP: NEGATIVE
RBC # BLD AUTO: 4.77 10*6/MM3 (ref 4.14–5.8)
SODIUM SERPL-SCNC: 141 MMOL/L (ref 136–145)
SP GR UR STRIP: 1.02 (ref 1–1.03)
TROPONIN T SERPL-MCNC: <0.01 NG/ML (ref 0–0.03)
UROBILINOGEN UR QL STRIP: ABNORMAL
WBC NRBC COR # BLD: 9.07 10*3/MM3 (ref 3.4–10.8)
WHOLE BLOOD HOLD COAG: NORMAL
WHOLE BLOOD HOLD SPECIMEN: NORMAL

## 2022-10-15 PROCEDURE — 84484 ASSAY OF TROPONIN QUANT: CPT | Performed by: EMERGENCY MEDICINE

## 2022-10-15 PROCEDURE — 80053 COMPREHEN METABOLIC PANEL: CPT | Performed by: EMERGENCY MEDICINE

## 2022-10-15 PROCEDURE — 93005 ELECTROCARDIOGRAM TRACING: CPT | Performed by: EMERGENCY MEDICINE

## 2022-10-15 PROCEDURE — 83690 ASSAY OF LIPASE: CPT | Performed by: EMERGENCY MEDICINE

## 2022-10-15 PROCEDURE — 85025 COMPLETE CBC W/AUTO DIFF WBC: CPT

## 2022-10-15 PROCEDURE — 81003 URINALYSIS AUTO W/O SCOPE: CPT | Performed by: EMERGENCY MEDICINE

## 2022-10-15 PROCEDURE — 36415 COLL VENOUS BLD VENIPUNCTURE: CPT

## 2022-10-15 PROCEDURE — 99284 EMERGENCY DEPT VISIT MOD MDM: CPT

## 2022-10-15 PROCEDURE — 93005 ELECTROCARDIOGRAM TRACING: CPT

## 2022-10-15 PROCEDURE — 93010 ELECTROCARDIOGRAM REPORT: CPT | Performed by: INTERNAL MEDICINE

## 2022-10-15 RX ORDER — SODIUM CHLORIDE 0.9 % (FLUSH) 0.9 %
10 SYRINGE (ML) INJECTION AS NEEDED
Status: DISCONTINUED | OUTPATIENT
Start: 2022-10-15 | End: 2022-10-16 | Stop reason: HOSPADM

## 2022-10-15 RX ADMIN — Medication 300 ML: at 22:40

## 2022-10-16 NOTE — ED NOTES
Pt refused enema.    Writer spoke with Dr. Branch Cancer, he stated to put the patient in isolation, no further orders at this time.

## 2022-10-16 NOTE — ED PROVIDER NOTES
Time: 22:02 EDT  Arrived by: Private vehicle  Chief Complaint: Constipation  History provided by: Patient  History is limited by: N/A    History of Present Illness:  Patient is a 36 y.o. year old male that presents to the emergency department with constipation since surgery      Constipation  Severity:  Severe  Time since last bowel movement:  3 days  Timing:  Constant  Progression:  Worsening  Chronicity:  New  Context: narcotics    Context comment:  Patient had hernia repair and has been taking Lortab and is unable to have a bowel movement.  Patient states he feels like he has to go but will not push because stomach hurts  Stool description:  None produced  Relieved by:  Nothing  Worsened by:  Narcotic pain medications  Ineffective treatments:  Stool softeners  Associated symptoms: abdominal pain ( Patient feels bloated and hernia site still sore) and flatus    Associated symptoms: no back pain, no diarrhea, no dysuria, no fever, no nausea, no urinary retention and no vomiting    Risk factors: hx of abdominal surgery and recent surgery          Similar Symptoms Previously: No    Recently seen: Patient had hernia repair on Thursday by Dr. Davenport      Patient Care Team  Primary Care Provider: TINA Lockett    Past Medical History:     No Known Allergies  Past Medical History:   Diagnosis Date   • Hypertension     no medications   • Umbilical hernia currently    currently being seen for surgery for hernia this admission      Past Surgical History:   Procedure Laterality Date   • UMBILICAL HERNIA REPAIR N/A 6/11/2021    Procedure: Robotic assisted laparoscopic umbilical herniorrhaphy with mesh placement;  Surgeon: Venkata Davenport MD;  Location: [facility];  Service: Robotics - [facility];  Laterality: N/A;   • VENTRAL HERNIA REPAIR N/A 10/13/2022    Procedure: UMBILICAL HERNIA REPAIR LAPAROSCOPIC WITH DAVINCI ROBOT;  Surgeon: Venkata Davenport MD;  Location: [facility];  [facility];   "Laterality: N/A;     Family History   Problem Relation Age of Onset   • No Known Problems Mother    • No Known Problems Father        Home Medications:  Prior to Admission medications    Medication Sig Start Date End Date Taking? Authorizing Provider   docusate sodium (Colace) 100 MG capsule Take 1 capsule by mouth 2 (Two) Times a Day As Needed for Constipation. 10/13/22 10/13/23  Venkata Davenport MD   HYDROcodone-acetaminophen (NORCO) 5-325 MG per tablet Take 1-2 tablets by mouth Every 4 (Four) Hours As Needed (Pain). 10/13/22   Venkata Davenport MD        Social History:   PT  reports that he has quit smoking. He smoked an average of 2 packs per day. He has never used smokeless tobacco. He reports that he does not drink alcohol and does not use drugs.    Record Review:  I have reviewed the patient's records in [employer].     Review of Systems  Review of Systems   Constitutional: Negative for chills, fatigue and fever.   HENT: Negative for ear pain, rhinorrhea and sore throat.    Eyes: Negative for visual disturbance.   Respiratory: Positive for shortness of breath ( Patient states his belly feels so bloated that he feels like he cannot breathe). Negative for cough.    Cardiovascular: Negative for chest pain.   Gastrointestinal: Positive for abdominal pain ( Patient feels bloated and hernia site still sore), constipation and flatus. Negative for diarrhea, nausea and vomiting.   Genitourinary: Negative for difficulty urinating and dysuria.   Musculoskeletal: Negative for arthralgias, back pain and myalgias.   Skin: Negative for rash.   Neurological: Negative for light-headedness and headaches.   Hematological: Negative for adenopathy.   Psychiatric/Behavioral: Negative.         Physical Exam  /58 (Patient Position: Sitting)   Pulse 87   Temp 98 °F (36.7 °C) (Oral)   Resp 18   Ht 177.8 cm (70\")   Wt 135 kg (298 lb 4.5 oz)   SpO2 99%   BMI 42.80 kg/m²     Physical Exam  Vitals and nursing note reviewed. " "  Constitutional:       General: He is not in acute distress.     Appearance: Normal appearance. He is not toxic-appearing.   HENT:      Head: Normocephalic and atraumatic.      Nose: Nose normal.      Mouth/Throat:      Mouth: Mucous membranes are moist.   Eyes:      Conjunctiva/sclera: Conjunctivae normal.   Cardiovascular:      Rate and Rhythm: Normal rate and regular rhythm.      Pulses: Normal pulses.      Heart sounds: Normal heart sounds.   Pulmonary:      Effort: Pulmonary effort is normal.      Breath sounds: Normal breath sounds.   Abdominal:      General: Bowel sounds are normal.      Palpations: Abdomen is soft.      Tenderness: There is no abdominal tenderness. There is no right CVA tenderness or left CVA tenderness.      Comments: Protuberant belly.  Trochanter sites within normal limits.   Musculoskeletal:         General: Normal range of motion.      Cervical back: Normal range of motion.   Skin:     General: Skin is warm and dry.      Comments: Surgical sites without signs of infection   Neurological:      General: No focal deficit present.      Mental Status: He is alert and oriented to person, place, and time.   Psychiatric:         Mood and Affect: Mood normal.         Behavior: Behavior normal.         Thought Content: Thought content normal.         Judgment: Judgment normal.           ED Course  /58 (Patient Position: Sitting)   Pulse 87   Temp 98 °F (36.7 °C) (Oral)   Resp 18   Ht 177.8 cm (70\")   Wt 135 kg (298 lb 4.5 oz)   SpO2 99%   BMI 42.80 kg/m²   Results for orders placed or performed during the hospital encounter of 10/15/22   Comprehensive Metabolic Panel    Specimen: Blood   Result Value Ref Range    Glucose 93 65 - 99 mg/dL    BUN 16 6 - 20 mg/dL    Creatinine 1.02 0.76 - 1.27 mg/dL    Sodium 141 136 - 145 mmol/L    Potassium 4.0 3.5 - 5.2 mmol/L    Chloride 102 98 - 107 mmol/L    CO2 29.6 (H) 22.0 - 29.0 mmol/L    Calcium 9.3 8.6 - 10.5 mg/dL    Total Protein 7.2 6.0 " - 8.5 g/dL    Albumin 4.20 3.50 - 5.20 g/dL    ALT (SGPT) 32 1 - 41 U/L    AST (SGOT) 30 1 - 40 U/L    Alkaline Phosphatase 64 39 - 117 U/L    Total Bilirubin 0.2 0.0 - 1.2 mg/dL    Globulin 3.0 gm/dL    A/G Ratio 1.4 g/dL    BUN/Creatinine Ratio 15.7 7.0 - 25.0    Anion Gap 9.4 5.0 - 15.0 mmol/L    eGFR 96.5 >60.0 mL/min/1.73   Lipase    Specimen: Blood   Result Value Ref Range    Lipase 20 13 - 60 U/L   Troponin    Specimen: Blood   Result Value Ref Range    Troponin T <0.010 0.000 - 0.030 ng/mL   CBC Auto Differential    Specimen: Blood   Result Value Ref Range    WBC 9.07 3.40 - 10.80 10*3/mm3    RBC 4.77 4.14 - 5.80 10*6/mm3    Hemoglobin 14.2 13.0 - 17.7 g/dL    Hematocrit 41.1 37.5 - 51.0 %    MCV 86.2 79.0 - 97.0 fL    MCH 29.8 26.6 - 33.0 pg    MCHC 34.5 31.5 - 35.7 g/dL    RDW 12.5 12.3 - 15.4 %    RDW-SD 39.2 37.0 - 54.0 fl    MPV 9.8 6.0 - 12.0 fL    Platelets 263 140 - 450 10*3/mm3    Neutrophil % 53.3 42.7 - 76.0 %    Lymphocyte % 35.1 19.6 - 45.3 %    Monocyte % 6.7 5.0 - 12.0 %    Eosinophil % 4.4 0.3 - 6.2 %    Basophil % 0.4 0.0 - 1.5 %    Immature Grans % 0.1 0.0 - 0.5 %    Neutrophils, Absolute 4.83 1.70 - 7.00 10*3/mm3    Lymphocytes, Absolute 3.18 (H) 0.70 - 3.10 10*3/mm3    Monocytes, Absolute 0.61 0.10 - 0.90 10*3/mm3    Eosinophils, Absolute 0.40 0.00 - 0.40 10*3/mm3    Basophils, Absolute 0.04 0.00 - 0.20 10*3/mm3    Immature Grans, Absolute 0.01 0.00 - 0.05 10*3/mm3    nRBC 0.0 0.0 - 0.2 /100 WBC   ECG 12 Lead   Result Value Ref Range    QT Interval 378 ms   Green Top (Gel)   Result Value Ref Range    Extra Tube Hold for add-ons.    Lavender Top   Result Value Ref Range    Extra Tube hold for add-on    Gold Top - SST   Result Value Ref Range    Extra Tube Hold for add-ons.    Light Blue Top   Result Value Ref Range    Extra Tube Hold for add-ons.      Medications   sodium chloride 0.9 % flush 10 mL (has no administration in time range)   molasses enema (has no administration in time range)      No results found.    Medical Decision Making:                     MDM  Number of Diagnoses or Management Options  Drug-induced constipation  Diagnosis management comments: Patient presented to the emergency department with a complaint of being unable to have a bowel movement since Wednesday night the night before his surgery.  Patient has been taking hydrocodone for pain management since hernia repair.  Patient states stool softeners are not helping.  We had discussed oral medications versus enema.  He wanted the enema originally to try to get some instantaneous relief but has since changed his mind and will go home on oral medications.  Patient wishes to leave before all lab and imaging has returned.  CBC was normal no other results at this time       Amount and/or Complexity of Data Reviewed  Clinical lab tests: reviewed and ordered  Tests in the radiology section of CPT®: ordered  Tests in the medicine section of CPT®: ordered and reviewed  Review and summarize past medical records: yes (Reviewed visit from hernia repair)    Risk of Complications, Morbidity, and/or Mortality  Presenting problems: low  Diagnostic procedures: low  Management options: low    Patient Progress  Patient progress: stable       Final diagnoses:   Drug-induced constipation        Disposition:  ED Disposition     ED Disposition   Discharge    Condition   Stable    Comment   --              Florencia Barfield, APRN  10/15/22 [id number]

## 2022-10-16 NOTE — DISCHARGE INSTRUCTIONS
Continue to take stool softeners.    Drink GoLytely.    Follow-up with your surgeon on Monday if no better

## 2022-10-17 LAB — QT INTERVAL: 378 MS

## 2022-10-27 ENCOUNTER — OFFICE VISIT (OUTPATIENT)
Dept: SURGERY | Facility: CLINIC | Age: 37
End: 2022-10-27

## 2022-10-27 VITALS — WEIGHT: 295 LBS | BODY MASS INDEX: 42.23 KG/M2 | HEIGHT: 70 IN

## 2022-10-27 DIAGNOSIS — K42.9 UMBILICAL HERNIA WITHOUT OBSTRUCTION AND WITHOUT GANGRENE: Primary | ICD-10-CM

## 2022-10-27 PROCEDURE — 99024 POSTOP FOLLOW-UP VISIT: CPT | Performed by: SURGERY

## 2022-10-27 NOTE — PROGRESS NOTES
"Chief Complaint  Post-op Follow-up (Umbilical hernia repair)    Subjective        Dominick Agarwal presents to [facility]  History of Present Illness    The patient presents today for a follow-up after the robotic umbilical hernia repair.    Post robotic umbilical hernia/diastasis repair  The patient reports that the surgery was a success compared to the other surgery. He reports that his symptoms after the surgery \"feels better, it feels different too.\" Although, the patient does report pain \"down on this side, a knot\" and states that he had severe pain during the second surgery as compared to the first surgery. He has a knot due to a double incision with a big suture. His knot should resolve after a while. His old mesh has been removed. He had a weakening of the muscles approximately in between the rectus muscles, a diastasis. He reports feeling \"alright for the most part\" after the surgery. His incision is healing well. The patient reports that he is eating, drinking, and, urinating normally. He also reports having normal bowel movements after the surgery. The patient has requested a medical note would like to return to work on 10/31/2022 or 11/01/2022. He will be doing light duty when returning to work. 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     Abdominal pain/spams  He is concerned about his appendix rupturing. He reports having pain on this side. The states that it is not related to eating. He denies ever having an appendicitis or cholecystectomy. He reports having random intermittent pain occasionally depending on how he twists and how long he stays twisted. He reports that his better weight is approximately 240 pounds. He currently weighs 298 pounds.   Objective   Vital Signs:  Ht 177.8 cm (70\")   Wt 134 kg (295 lb)   BMI 42.33 kg/m²   Estimated body mass index is 42.33 kg/m² as calculated from the following:    Height " "as of this encounter: 177.8 cm (70\").    Weight as of this encounter: 134 kg (295 lb).          Physical Exam  Pulmonary:      Effort: Pulmonary effort is normal.      Breath sounds: Normal breath sounds.      Comments: The patient is in no acute distress, non-labored breathing.     Abdominal:      Comments: The patient's abdomen is soft and the incision is healing well with no signs of recurrence of the hernia even with Valsalva.        Result Review :                Assessment and Plan   There are no diagnoses linked to this encounter.        1. Post robotic umbilical hernia  - The patient is doing well and is pleased with his overall progress. He may follow up with this office and call with any questions or concerns.   - This was a repeat surgery for the patient, and he has been required to do no heavy lifting greater than 15 pounds for the next 8 weeks. This has been discussed with the patient and all questions have been answered. He voiced an understanding and agreed to proceed with the above plan.    He will follow up as needed.       Follow Up   No follow-ups on file.  Patient was given instructions and counseling regarding his condition or for health maintenance advice. Please see specific information pulled into the AVS if appropriate.     Transcribed from ambient dictation for Venkata Davenport MD by Coreen ROCHA.  10/27/22   11:56 EDT    Patient or patient representative verbalized consent to the visit recording.  I have personally performed the services described in this document as transcribed by the above individual, and it is both accurate and complete.      "

## (undated) DEVICE — ENDOPATH PNEUMONEEDLE INSUFFLATION NEEDLES WITH LUER LOCK CONNECTORS 120MM: Brand: ENDOPATH

## (undated) DEVICE — SOL IRR NACL 0.9PCT BT 1000ML

## (undated) DEVICE — INTENDED FOR TISSUE SEPARATION, AND OTHER PROCEDURES THAT REQUIRE A SHARP SURGICAL BLADE TO PUNCTURE OR CUT.: Brand: BARD-PARKER ® CARBON RIB-BACK BLADES

## (undated) DEVICE — SOL ANTISTICK CAUTRY ELECTROLUBE LF

## (undated) DEVICE — 3 RING SUTURE PASSER - 16 CM: Brand: 3 RING SUTURE PASSER - 16 CM

## (undated) DEVICE — SUT VIC PLS CTD BR 0 TIE 18IN VIL

## (undated) DEVICE — ANTIBACTERIAL VIOLET BRAIDED (POLYGLACTIN 910), SYNTHETIC ABSORBABLE SUTURE: Brand: COATED VICRYL

## (undated) DEVICE — TIP COVER ACCESSORY

## (undated) DEVICE — SLV SCD LEG COMFORT KENDALLSCD MD REPROC

## (undated) DEVICE — GLV SURG SENSICARE SLT PF LF 7.5 STRL

## (undated) DEVICE — STERILE COTTON BALLS LARGE 5/P: Brand: MEDLINE

## (undated) DEVICE — TROCAR: Brand: KII FIOS FIRST ENTRY

## (undated) DEVICE — SLV SCD KN/LEN ADJ EXPRSS BLENDED MD 1P/U

## (undated) DEVICE — SYR LL TP 10ML STRL

## (undated) DEVICE — APPL CHLORAPREP HI/LITE 26ML ORNG

## (undated) DEVICE — STERILE POLYISOPRENE POWDER-FREE SURGICAL GLOVES WITH EMOLLIENT COATING: Brand: PROTEXIS

## (undated) DEVICE — ANTIBACTERIAL UNDYED BRAIDED (POLYGLACTIN 910), SYNTHETIC ABSORBABLE SUTURE: Brand: COATED VICRYL

## (undated) DEVICE — VISUALIZATION SYSTEM: Brand: CLEARIFY

## (undated) DEVICE — GOWN,REINFRCE,POLY,SIRUS,BREATH SLV,XXLG: Brand: MEDLINE

## (undated) DEVICE — GLV SURG SENSICARE SLT PF LF 6.5 STRL

## (undated) DEVICE — CANNULA SEAL

## (undated) DEVICE — DRSNG WND GZ CURAD OIL EMULSION 3X3IN STRL

## (undated) DEVICE — LAPAROVUE VISIBILITY SYSTEM LAPAROSCOPIC SOLUTIONS: Brand: LAPAROVUE

## (undated) DEVICE — ABDOMINAL BINDER, ILIO BYPASS: Brand: DEROYAL

## (undated) DEVICE — DAVINCI-LF: Brand: MEDLINE INDUSTRIES, INC.

## (undated) DEVICE — SUT PDS 1 CT1 36IN Z347H

## (undated) DEVICE — DRSNG SURESITE WNDW 4X4.5

## (undated) DEVICE — ARM DRAPE

## (undated) DEVICE — SKIN AFFIX SURG ADHESIVE 72/CS 0.55ML: Brand: MEDLINE

## (undated) DEVICE — PENCL E/S SMOKEEVAC W/TELESCP CANN